# Patient Record
Sex: MALE | Employment: UNEMPLOYED | URBAN - METROPOLITAN AREA
[De-identification: names, ages, dates, MRNs, and addresses within clinical notes are randomized per-mention and may not be internally consistent; named-entity substitution may affect disease eponyms.]

---

## 2024-03-01 ENCOUNTER — OFFICE VISIT (OUTPATIENT)
Dept: URGENT CARE | Facility: CLINIC | Age: 49
End: 2024-03-01

## 2024-03-01 VITALS
TEMPERATURE: 96.5 F | RESPIRATION RATE: 76 BRPM | HEART RATE: 76 BPM | SYSTOLIC BLOOD PRESSURE: 115 MMHG | DIASTOLIC BLOOD PRESSURE: 79 MMHG | OXYGEN SATURATION: 97 %

## 2024-03-01 DIAGNOSIS — S01.322A: Primary | ICD-10-CM

## 2024-03-01 RX ORDER — OFLOXACIN 3 MG/ML
10 SOLUTION AURICULAR (OTIC) 2 TIMES DAILY
Qty: 5 ML | Refills: 0 | Status: SHIPPED | OUTPATIENT
Start: 2024-03-01

## 2024-03-01 NOTE — PATIENT INSTRUCTIONS
The foreign body was removed  Start on ear drops to help prevent infection.   Go see your regular family doctor in 3-5 days.  Go to emergency room (ER) if you are getting worse.

## 2024-03-01 NOTE — PROGRESS NOTES
[facility]        NAME: Luis M Stapleton is a 48 y.o. male  : 1975    MRN: [id number]  DATE: 2024  TIME: 11:36 AM    Assessment and Plan   Laceration of left ear with foreign body, initial encounter [S01.322A]  1. Laceration of left ear with foreign body, initial encounter  Foreign body removal    ofloxacin (FLOXIN) 0.3 % otic solution        The foreign body was removed - there was pain and abrasion present. Will treat with ear drops.   Follow up with primary care in 3-5 days.  Go to ER if symptoms get worse.     Patient Instructions     The foreign body was removed  Start on ear drops to help prevent infection.   Go see your regular family doctor in 3-5 days.  Go to emergency room (ER) if you are getting worse.     Chief Complaint     Chief Complaint   Patient presents with    Foreign Body in Ear     Patient reports ear bud cover is stuck in left ear.Via translation service #[id number]         History of Present Illness       Presents with concern for ear bud stuck in the left ear. He notes this happened yesterday. He did try removal without relief. Denies any other symptoms.         Review of Systems   Review of Systems   Constitutional:  Negative for chills and fever.   HENT:  Positive for ear pain. Negative for sore throat.    Respiratory:  Negative for cough and shortness of breath.    Cardiovascular:  Negative for chest pain and palpitations.   Gastrointestinal:  Negative for diarrhea, nausea and vomiting.   Musculoskeletal:  Negative for myalgias.   Skin:  Negative for color change and rash.   Psychiatric/Behavioral:  Negative for confusion.    All other systems reviewed and are negative.        Current Medications       Current Outpatient Medications:     ofloxacin (FLOXIN) 0.3 % otic solution, Administer 10 drops into the left ear 2 (two) times a day, Disp: 5 mL, Rfl: 0    Current Allergies     Allergies as of 2024    (No Known Allergies)            The following  portions of the patient's history were reviewed and updated as appropriate: allergies, current medications, past family history, past medical history, past social history, past surgical history and problem list.     No past medical history on file.    No past surgical history on file.    No family history on file.      Medications have been verified.        Objective   /79   Pulse 76   Temp (!) 96.5 °F (35.8 °C) (Temporal)   Resp (!) 76   SpO2 97%        Physical Exam     Physical Exam  Vitals reviewed.   Constitutional:       Appearance: Normal appearance.   HENT:      Left Ear: Laceration, swelling and tenderness present. A foreign body is present.      Ears:      Comments: There are abrasions at 1 o'clock and 6 o'clock position with ear bud present blocking the view of the TM.   Cardiovascular:      Rate and Rhythm: Normal rate and regular rhythm.      Pulses: Normal pulses.      Heart sounds: Normal heart sounds. No murmur heard.  Pulmonary:      Effort: Pulmonary effort is normal. No respiratory distress.      Breath sounds: Normal breath sounds.   Skin:     General: Skin is warm and dry.      Capillary Refill: Capillary refill takes less than 2 seconds.   Neurological:      General: No focal deficit present.      Mental Status: He is alert and oriented to person, place, and time.   Psychiatric:         Mood and Affect: Mood normal.         Behavior: Behavior normal.         Universal Protocol:  Consent: Verbal consent obtained.  Consent given by: patient  Timeout called at: 3/1/2024 11:28 AM.  Patient understanding: patient states understanding of the procedure being performed  Patient identity confirmed: verbally with patient  Foreign body removal    Date/Time: 3/1/2024 10:00 AM    Performed by: RAEGAN Walker  Authorized by: RAEGAN Walker  Body area: ear  Location details: left ear  Localization method: visualized  Removal mechanism: curette and alligator  forceps  Complexity: simple  1 objects recovered.  Objects recovered: ear bud  Post-procedure assessment: foreign body removed  Patient tolerance: patient tolerated the procedure well with no immediate complications  Comments: Abrasions noted before the procedure started.